# Patient Record
Sex: FEMALE | Race: WHITE | NOT HISPANIC OR LATINO
[De-identification: names, ages, dates, MRNs, and addresses within clinical notes are randomized per-mention and may not be internally consistent; named-entity substitution may affect disease eponyms.]

---

## 2017-11-06 ENCOUNTER — APPOINTMENT (OUTPATIENT)
Dept: VASCULAR SURGERY | Facility: CLINIC | Age: 67
End: 2017-11-06
Payer: COMMERCIAL

## 2017-11-06 PROCEDURE — 99213 OFFICE O/P EST LOW 20 MIN: CPT

## 2020-03-17 ENCOUNTER — APPOINTMENT (OUTPATIENT)
Dept: OTOLARYNGOLOGY | Facility: CLINIC | Age: 70
End: 2020-03-17

## 2021-03-09 ENCOUNTER — APPOINTMENT (OUTPATIENT)
Dept: OTOLARYNGOLOGY | Facility: CLINIC | Age: 71
End: 2021-03-09

## 2021-03-25 ENCOUNTER — APPOINTMENT (OUTPATIENT)
Dept: ORTHOPEDIC SURGERY | Facility: CLINIC | Age: 71
End: 2021-03-25
Payer: MEDICARE

## 2021-03-25 VITALS — WEIGHT: 105 LBS | BODY MASS INDEX: 19.83 KG/M2 | RESPIRATION RATE: 16 BRPM | HEIGHT: 61 IN

## 2021-03-25 PROCEDURE — 73110 X-RAY EXAM OF WRIST: CPT | Mod: 50

## 2021-03-25 PROCEDURE — 99204 OFFICE O/P NEW MOD 45 MIN: CPT

## 2021-03-25 RX ORDER — BUPROPION HYDROCHLORIDE 100 MG/1
TABLET, FILM COATED ORAL
Refills: 0 | Status: ACTIVE | COMMUNITY

## 2021-06-17 ENCOUNTER — APPOINTMENT (OUTPATIENT)
Dept: ORTHOPEDIC SURGERY | Facility: CLINIC | Age: 71
End: 2021-06-17
Payer: MEDICARE

## 2021-06-17 VITALS — RESPIRATION RATE: 16 BRPM | BODY MASS INDEX: 19.83 KG/M2 | WEIGHT: 105 LBS | HEIGHT: 61 IN

## 2021-06-17 PROCEDURE — 99213 OFFICE O/P EST LOW 20 MIN: CPT

## 2021-08-27 ENCOUNTER — NON-APPOINTMENT (OUTPATIENT)
Age: 71
End: 2021-08-27

## 2021-08-30 ENCOUNTER — NON-APPOINTMENT (OUTPATIENT)
Age: 71
End: 2021-08-30

## 2021-08-30 ENCOUNTER — APPOINTMENT (OUTPATIENT)
Dept: VASCULAR SURGERY | Facility: CLINIC | Age: 71
End: 2021-08-30
Payer: MEDICARE

## 2021-08-30 VITALS
HEART RATE: 103 BPM | SYSTOLIC BLOOD PRESSURE: 169 MMHG | DIASTOLIC BLOOD PRESSURE: 97 MMHG | WEIGHT: 105 LBS | HEIGHT: 60 IN | BODY MASS INDEX: 20.62 KG/M2

## 2021-08-30 PROCEDURE — 93970 EXTREMITY STUDY: CPT

## 2021-08-30 PROCEDURE — 99202 OFFICE O/P NEW SF 15 MIN: CPT

## 2021-09-01 NOTE — ASSESSMENT
[Arterial/Venous Disease] : arterial/venous disease [Medication Management] : medication management [FreeTextEntry1] : 71 y/o F with plaque psoriasis presents for evaluation of varicose veins. On exam, Plaque psoriasis throughout b/l legs with scattered ecchymosis, skin appears frail. VV appreciated over the calves. Toes are warm to touch with adequate capillary refill. Venous duplex of the legs is negative for DVT/SVT. No venous reflux appreciated. I discussed findings and treatment options with patient. Given delicacy of her skin she poses a higher risk than benefit from any vascular surgery interventions. Given her vv may be treated medically. I have recommended pt continues to keep legs well moisturized and c/w compression using compression stockings. She may continue to see me as needed.

## 2021-09-01 NOTE — PROCEDURE
[FreeTextEntry1] : BLE venous US ordered today to r/o DVT: NEgative DVT/SVT. No venous reflux appreciated.

## 2021-09-01 NOTE — HISTORY OF PRESENT ILLNESS
[FreeTextEntry1] : 71 y/o F w/h/o plaque psoriasis presents for initial evaluation of varicose veins. She is a known patient of mine last seen here in 2017 for similar symptoms. Patient has been having a long standing history of varicosities but over the years her varicose veins have become larger, more pronounced and tender to touch. She states that her varicose veins burn, itch and her legs feel heavy. She denies any hx of DVT/SVT.  Denies any leg swelling, ulceration or skin color changes.

## 2021-09-01 NOTE — PHYSICAL EXAM
[Respiratory Effort] : normal respiratory effort [Normal Rate and Rhythm] : normal rate and rhythm [Varicose Veins Of Lower Extremities] : bilaterally [Ankle Swelling On The Right] : mild [Alert] : alert [Oriented to Person] : oriented to person [Oriented to Place] : oriented to place [Oriented to Time] : oriented to time [Calm] : calm [Ankle Swelling (On Exam)] : not present [] : not present [Abdomen Tenderness] : ~T ~M No abdominal tenderness [de-identified] : Friendly, cooperative, NAD  [de-identified] : NC/AT  [de-identified] : Supple  [de-identified] : FROM 5/5 x 4.  [de-identified] : Plaque psoriasis throughout b/l legs with scattered ecchymosis, skin appears frail, small vv over the calves. Toes are warm to touch with adequate capillary refill.

## 2021-09-01 NOTE — ADDENDUM
[FreeTextEntry1] : This note was written by Karin Nieves on 08/30/2021 acting as scribe for Twin Dang M.D.\par \par

## 2021-11-10 ENCOUNTER — APPOINTMENT (OUTPATIENT)
Dept: ORTHOPEDIC SURGERY | Facility: CLINIC | Age: 71
End: 2021-11-10
Payer: MEDICARE

## 2021-11-10 VITALS — HEIGHT: 60 IN | BODY MASS INDEX: 20.62 KG/M2 | WEIGHT: 105 LBS

## 2021-11-10 PROCEDURE — 99215 OFFICE O/P EST HI 40 MIN: CPT | Mod: 25

## 2021-11-10 PROCEDURE — 20605 DRAIN/INJ JOINT/BURSA W/O US: CPT | Mod: 50

## 2022-05-11 ENCOUNTER — APPOINTMENT (OUTPATIENT)
Dept: ORTHOPEDIC SURGERY | Facility: CLINIC | Age: 72
End: 2022-05-11
Payer: MEDICARE

## 2022-05-11 VITALS — WEIGHT: 105 LBS | HEIGHT: 60 IN | BODY MASS INDEX: 20.62 KG/M2 | RESPIRATION RATE: 16 BRPM

## 2022-05-11 DIAGNOSIS — M18.0 BILATERAL PRIMARY OSTEOARTHRITIS OF FIRST CARPOMETACARPAL JOINTS: ICD-10-CM

## 2022-05-11 PROCEDURE — 99214 OFFICE O/P EST MOD 30 MIN: CPT | Mod: 25

## 2022-05-11 PROCEDURE — 20605 DRAIN/INJ JOINT/BURSA W/O US: CPT | Mod: RT

## 2022-08-25 ENCOUNTER — NON-APPOINTMENT (OUTPATIENT)
Age: 72
End: 2022-08-25

## 2022-08-25 ENCOUNTER — APPOINTMENT (OUTPATIENT)
Dept: OTOLARYNGOLOGY | Facility: CLINIC | Age: 72
End: 2022-08-25

## 2022-08-25 VITALS
BODY MASS INDEX: 20.62 KG/M2 | WEIGHT: 105 LBS | HEART RATE: 82 BPM | HEIGHT: 60 IN | TEMPERATURE: 98.1 F | DIASTOLIC BLOOD PRESSURE: 51 MMHG | OXYGEN SATURATION: 99 % | SYSTOLIC BLOOD PRESSURE: 158 MMHG

## 2022-08-25 PROCEDURE — 92504 EAR MICROSCOPY EXAMINATION: CPT

## 2022-08-25 PROCEDURE — 92550 TYMPANOMETRY & REFLEX THRESH: CPT

## 2022-08-25 PROCEDURE — 92557 COMPREHENSIVE HEARING TEST: CPT

## 2022-08-25 PROCEDURE — 99204 OFFICE O/P NEW MOD 45 MIN: CPT | Mod: 25

## 2022-08-25 NOTE — PHYSICAL EXAM
[Binocular Microscopic Exam] : Binocular microscopic exam was performed [Normal] : assessment of respiratory effort is normal

## 2022-08-25 NOTE — ASSESSMENT
[FreeTextEntry1] : Discussed general tinnitus treatment including masking, amplification when appropriate, alternative treatments, helpful smartphone applications, CBT and OTC medications. VERONIQUE submitted; in the meantime she d/c her gabapentin and try a course of amitriptyline. RTC 30 d sooner prn

## 2022-08-25 NOTE — DATA REVIEWED
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

## 2022-08-27 ENCOUNTER — TRANSCRIPTION ENCOUNTER (OUTPATIENT)
Age: 72
End: 2022-08-27

## 2022-09-12 ENCOUNTER — APPOINTMENT (OUTPATIENT)
Dept: OTOLARYNGOLOGY | Facility: CLINIC | Age: 72
End: 2022-09-12

## 2022-09-12 PROCEDURE — 99214 OFFICE O/P EST MOD 30 MIN: CPT | Mod: 95

## 2022-09-12 NOTE — DATA REVIEWED
[de-identified] : 12/18: mild-severe SNHL AU\par 7/22: nl to mod-sev SNHL AU;  AU\par - Immitance testing w/ type A AU\par 8/22: mild to sev SNHL AD, nl to mod-sev SNHL AS; /90\par - Immitance testing w/ type A AU\par  [de-identified] : MR brain/IAC 7/22: SVID

## 2022-09-12 NOTE — REASON FOR VISIT
[Home] : at home, [unfilled] , at the time of the visit. [Medical Office: ([facility])___] : at the medical office located in  [Patient] : the patient

## 2022-09-12 NOTE — HISTORY OF PRESENT ILLNESS
[de-identified] : Nonlateralizing hissing-steam-like bothersome tinnitus since 7/22 but known longstanding hearing loss; the tinnitus is extremely bothersome but fluctuates quite a bit. She's tried a noise machine, gabapentin and lipoflavanoid (for a month) none of which worked; she's now tried the amitriptyline which didn't do anything. She tried a loaner HA which successfully helped the tinnitus. (+) hx abx for meningitis. Her father had hearing loss. Denies: vertigo, ear pain, drainage, frequent loud noise exp/shooting, frequent infections, hx of ear surgery or chemo. Qtip use: no\par

## 2022-09-12 NOTE — ASSESSMENT
[FreeTextEntry1] : She would like to try a higher dose of Amitriptyline prior to paying for an HA. RTC 1 month sooner prn

## 2022-09-29 ENCOUNTER — APPOINTMENT (OUTPATIENT)
Dept: OTOLARYNGOLOGY | Facility: CLINIC | Age: 72
End: 2022-09-29

## 2022-09-29 PROCEDURE — 99214 OFFICE O/P EST MOD 30 MIN: CPT | Mod: 95

## 2022-09-29 NOTE — REASON FOR VISIT
[Home] : at home, [unfilled] , at the time of the visit. [Medical Office: ([facility])___] : at the medical office located in  [Patient] : the patient [Subsequent Evaluation] : a subsequent evaluation for [FreeTextEntry2] : f/u tinnitus

## 2022-09-29 NOTE — PHYSICAL EXAM
[de-identified] : Exam limited d/t lack of in-person appearance\par Constitutional: alert & oriented\par Face/head: normocephalic & atraumatic\par Ears & nose: normal external appearance.\par Neck: no visible lymphadenopathy or masses. Normal apparent range of motion. \par Eyes: no nystagmus or scleral icterus\par Neuro: CN 2-12 grossly intact\par Skin: exposed head & neck skin unremarkable..\par Resp: no dyspnea or coughing\par Psych: normal affect and appropriate linear thinking\par

## 2022-09-29 NOTE — HISTORY OF PRESENT ILLNESS
[de-identified] : Nonlateralizing hissing-steam-like bothersome tinnitus since 7/22 but known longstanding hearing loss; the tinnitus is extremely bothersome but fluctuates quite a bit. She's tried a noise machine, gabapentin and lipoflavanoid (for a month) none of which worked. Now s/p trial of amitriptyline which gave her a very dry mouth but helped a little. (+) hx abx for meningitis. Her father had hearing loss. Denies: vertigo, ear pain, drainage, frequent loud noise exp/shooting, frequent infections, hx of ear surgery or chemo. Qtip use: no\par

## 2022-09-29 NOTE — DATA REVIEWED
[de-identified] : 12/18: mild-severe SNHL AU\par 7/22: nl to mod-sev SNHL AU;  AU\par - Immitance testing w/ type A AU\par 8/22: mild to sev SNHL AD, nl to mod-sev SNHL AS; /90\par - Immitance testing w/ type A AU\par  [de-identified] : MR brain/IAC 7/22: SVID

## 2022-10-15 ENCOUNTER — APPOINTMENT (OUTPATIENT)
Dept: OTOLARYNGOLOGY | Facility: CLINIC | Age: 72
End: 2022-10-15

## 2022-10-15 PROCEDURE — 99214 OFFICE O/P EST MOD 30 MIN: CPT | Mod: 95

## 2022-10-15 NOTE — PHYSICAL EXAM
[de-identified] : Exam limited d/t lack of in-person appearance\par Constitutional: alert & oriented\par Face/head: normocephalic & atraumatic\par Ears & nose: normal external appearance.\par Neck: no visible lymphadenopathy or masses. Normal apparent range of motion. \par Eyes: no nystagmus or scleral icterus\par Neuro: CN 2-12 grossly intact\par Skin: exposed head & neck skin unremarkable..\par Resp: no dyspnea or coughing\par Psych: normal affect and appropriate linear thinking\par

## 2022-10-15 NOTE — REASON FOR VISIT
[Subsequent Evaluation] : a subsequent evaluation for [FreeTextEntry2] : f/u tinnitus [Home] : at home, [unfilled] , at the time of the visit. [Medical Office: ([facility])___] : at the medical office located in  [Patient] : the patient [FreeTextEntry4] :

## 2022-10-15 NOTE — DATA REVIEWED
[de-identified] : 12/18: mild-severe SNHL AU\par 7/22: nl to mod-sev SNHL AU;  AU\par - Immitance testing w/ type A AU\par 8/22: mild to sev SNHL AD, nl to mod-sev SNHL AS; /90\par - Immitance testing w/ type A AU\par  [de-identified] : MR brain/IAC 7/22: SVID

## 2022-10-15 NOTE — HISTORY OF PRESENT ILLNESS
[de-identified] : Nonlateralizing hissing-steam-like bothersome tinnitus since 7/22 but known longstanding hearing loss; the tinnitus was extremely bothersome but  she is now dramatically improved on pregabalin though w/ periods of breakthrough. Previously she failed a noise machine, gabapentin and lipoflavanoid (for a month) none of which worked. No further dry mouth off amitriptyline. (+) hx abx for meningitis. Her father had hearing loss. Denies: vertigo, ear pain, drainage, frequent loud noise exp/shooting, frequent infections, hx of ear surgery or chemo. Qtip use: no\par

## 2022-10-15 NOTE — ASSESSMENT
[FreeTextEntry1] : Increase to TID; we discussed possibly trying an extended release form. RTC 3 months sooner prn

## 2022-11-01 ENCOUNTER — APPOINTMENT (OUTPATIENT)
Dept: OTOLARYNGOLOGY | Facility: CLINIC | Age: 72
End: 2022-11-01

## 2022-11-01 PROCEDURE — 99214 OFFICE O/P EST MOD 30 MIN: CPT | Mod: 95

## 2022-11-01 NOTE — REASON FOR VISIT
[Subsequent Evaluation] : a subsequent evaluation for [Home] : at home, [unfilled] , at the time of the visit. [Medical Office: ([facility])___] : at the medical office located in  [Patient] : the patient [FreeTextEntry2] : f/u tinnitus

## 2022-11-01 NOTE — HISTORY OF PRESENT ILLNESS
[de-identified] : Nonlateralizing hissing-steam-like bothersome tinnitus since 7/22 with known longstanding hearing loss; the tinnitus was extremely bothersome but  she is now dramatically improved on pregabalin though w/ periods of breakthrough which have worsened with a recent rx w/ amox/clav for a sinus infection. Previously she failed a noise machine, gabapentin and lipoflavanoid (for a month) none of which worked. No further dry mouth off amitriptyline. (+) hx abx for meningitis. Her father had hearing loss. Denies: vertigo, ear pain, drainage, frequent loud noise exp/shooting, frequent infections, hx of ear surgery or chemo. Qtip use: no\par

## 2022-11-01 NOTE — DATA REVIEWED
[de-identified] : 12/18: mild-severe SNHL AU\par 7/22: nl to mod-sev SNHL AU;  AU\par - Immitance testing w/ type A AU\par 8/22: mild to sev SNHL AD, nl to mod-sev SNHL AS; /90\par - Immitance testing w/ type A AU [de-identified] : MR brain/IAC 7/22: SVID

## 2022-11-01 NOTE — ASSESSMENT
[FreeTextEntry1] : She would like to try a once/day dosing; will attempt to get her the ER formulation.

## 2022-11-28 ENCOUNTER — APPOINTMENT (OUTPATIENT)
Dept: OTOLARYNGOLOGY | Facility: CLINIC | Age: 72
End: 2022-11-28

## 2022-11-28 VITALS
SYSTOLIC BLOOD PRESSURE: 146 MMHG | WEIGHT: 105 LBS | BODY MASS INDEX: 20.62 KG/M2 | HEIGHT: 60 IN | HEART RATE: 103 BPM | OXYGEN SATURATION: 98 % | TEMPERATURE: 98.1 F | DIASTOLIC BLOOD PRESSURE: 75 MMHG

## 2022-11-28 PROCEDURE — 99214 OFFICE O/P EST MOD 30 MIN: CPT | Mod: 25

## 2022-11-28 PROCEDURE — 92504 EAR MICROSCOPY EXAMINATION: CPT

## 2022-11-28 RX ORDER — CLOBETASOL PROPIONATE 0.5 MG/G
0.05 CREAM TOPICAL
Qty: 60 | Refills: 0 | Status: ACTIVE | COMMUNITY
Start: 2022-03-07

## 2022-11-28 RX ORDER — TRIAMCINOLONE ACETONIDE 1 MG/G
0.1 CREAM TOPICAL
Qty: 454 | Refills: 0 | Status: ACTIVE | COMMUNITY
Start: 2022-03-07

## 2022-11-28 RX ORDER — AMITRIPTYLINE HYDROCHLORIDE 25 MG/1
25 TABLET, FILM COATED ORAL
Qty: 90 | Refills: 2 | Status: DISCONTINUED | COMMUNITY
Start: 2022-08-25 | End: 2022-11-28

## 2022-11-28 RX ORDER — NEOMYCIN SULFATE, POLYMYXIN B SULFATE AND HYDROCORTISONE 3.5; 10000; 1 MG/ML; [IU]/ML; MG/ML
3.5-10000-1 SOLUTION AURICULAR (OTIC)
Qty: 10 | Refills: 0 | Status: DISCONTINUED | COMMUNITY
Start: 2022-11-09

## 2022-11-28 RX ORDER — AMOXICILLIN AND CLAVULANATE POTASSIUM 875; 125 MG/1; MG/1
875-125 TABLET, COATED ORAL
Qty: 20 | Refills: 0 | Status: DISCONTINUED | COMMUNITY
Start: 2022-11-23

## 2022-11-28 RX ORDER — OXYCODONE AND ACETAMINOPHEN 5; 325 MG/1; MG/1
5-325 TABLET ORAL
Qty: 12 | Refills: 0 | Status: DISCONTINUED | COMMUNITY
Start: 2022-08-19

## 2022-11-28 RX ORDER — DULOXETINE HYDROCHLORIDE 20 MG/1
20 CAPSULE, DELAYED RELEASE PELLETS ORAL
Qty: 30 | Refills: 0 | Status: DISCONTINUED | COMMUNITY
Start: 2022-06-13

## 2022-11-28 RX ORDER — BUPROPION HYDROCHLORIDE 300 MG/1
300 TABLET, EXTENDED RELEASE ORAL
Qty: 30 | Refills: 0 | Status: ACTIVE | COMMUNITY
Start: 2022-11-12

## 2022-11-28 RX ORDER — AZELASTINE HYDROCHLORIDE 137 UG/1
0.1 SPRAY, METERED NASAL
Qty: 30 | Refills: 0 | Status: ACTIVE | COMMUNITY
Start: 2022-06-22

## 2022-11-28 RX ORDER — FAMOTIDINE 40 MG/1
40 TABLET, FILM COATED ORAL
Qty: 60 | Refills: 0 | Status: DISCONTINUED | COMMUNITY
Start: 2022-10-27

## 2022-11-28 NOTE — DATA REVIEWED
[de-identified] : 12/18: mild-severe SNHL AU\par 7/22: nl to mod-sev SNHL AU;  AU\par - Immitance testing w/ type A AU\par 8/22: mild to sev SNHL AD, nl to mod-sev SNHL AS; /90\par - Immitance testing w/ type A AU [de-identified] : MR brain/IAC 7/22: SVID [Time Spent: ___ minutes] : I have spent [unfilled] minutes of time on the encounter.

## 2022-11-28 NOTE — HISTORY OF PRESENT ILLNESS
[de-identified] : Nonlateralizing hissing-steam-like bothersome tinnitus since 7/22 with known longstanding hearing loss; the tinnitus was extremely bothersome and she previously she failed a noise machine, gabapentin and lipoflavanoid (for a month). She now returns after pregabalin which worked as an immediate release form but when we switched to LA it stopped working. No further dry mouth off amitriptyline. (+) hx abx for meningitis. Her father had hearing loss. Denies: vertigo, ear pain, drainage, frequent loud noise exp/shooting, frequent infections, hx of ear surgery or chemo. Qtip use: no\par

## 2022-11-28 NOTE — ASSESSMENT
[FreeTextEntry1] : We discussed options & will increase her pregabalin to the higher dose; we discussed possibly adding back amitriptyline but I asked her to discuss this w/ her psychiatrist.

## 2022-11-29 RX ORDER — PREGABALIN 75 MG/1
75 CAPSULE ORAL 3 TIMES DAILY
Qty: 270 | Refills: 1 | Status: DISCONTINUED | COMMUNITY
Start: 2022-09-29 | End: 2022-11-29

## 2022-12-20 ENCOUNTER — APPOINTMENT (OUTPATIENT)
Dept: OTOLARYNGOLOGY | Facility: CLINIC | Age: 72
End: 2022-12-20

## 2022-12-20 PROCEDURE — 99214 OFFICE O/P EST MOD 30 MIN: CPT | Mod: 95

## 2022-12-20 RX ORDER — PREGABALIN 165 MG/1
165 TABLET, FILM COATED, EXTENDED RELEASE ORAL
Qty: 30 | Refills: 2 | Status: DISCONTINUED | COMMUNITY
Start: 2022-11-01 | End: 2022-12-20

## 2022-12-20 RX ORDER — PREGABALIN 82.5 MG/1
82.5 TABLET, FILM COATED, EXTENDED RELEASE ORAL
Qty: 60 | Refills: 0 | Status: DISCONTINUED | COMMUNITY
Start: 2022-11-28 | End: 2022-12-20

## 2022-12-20 NOTE — HISTORY OF PRESENT ILLNESS
[Home] : at home, [unfilled] , at the time of the visit. [Medical Office: ([facility])___] : at the medical office located in  [Spouse] : spouse [Verbal consent obtained from patient] : the patient, [unfilled] [de-identified] : Nonlateralizing hissing-steam-like bothersome tinnitus since 7/22 with known longstanding hearing loss; the tinnitus was extremely bothersome and she previously she failed a noise machine, gabapentin and lipoflavanoid (for a month). She now returns after pregabalin which worked as an immediate release form but when we switched to LA it stopped working- now only with 4 days of relief with the higher dose of LA dosing. No further dry mouth off amitriptyline. (+) hx abx for meningitis. Her father had hearing loss. Denies: vertigo, ear pain, drainage, frequent loud noise exp/shooting, frequent infections, hx of ear surgery or chemo. Qtip use: no\par

## 2022-12-20 NOTE — DATA REVIEWED
[de-identified] : 12/18: mild-severe SNHL AU\par 7/22: nl to mod-sev SNHL AU;  AU\par - Immitance testing w/ type A AU\par 8/22: mild to sev SNHL AD, nl to mod-sev SNHL AS; /90\par - Immitance testing w/ type A AU [de-identified] : MR brain/IAC 7/22: SVID

## 2023-01-03 ENCOUNTER — TRANSCRIPTION ENCOUNTER (OUTPATIENT)
Age: 73
End: 2023-01-03

## 2023-01-06 ENCOUNTER — TRANSCRIPTION ENCOUNTER (OUTPATIENT)
Age: 73
End: 2023-01-06

## 2023-01-17 ENCOUNTER — APPOINTMENT (OUTPATIENT)
Dept: OTOLARYNGOLOGY | Facility: CLINIC | Age: 73
End: 2023-01-17

## 2023-01-31 ENCOUNTER — APPOINTMENT (OUTPATIENT)
Dept: OTOLARYNGOLOGY | Facility: CLINIC | Age: 73
End: 2023-01-31
Payer: MEDICARE

## 2023-01-31 DIAGNOSIS — H93.13 TINNITUS, BILATERAL: ICD-10-CM

## 2023-01-31 DIAGNOSIS — E03.9 HYPOTHYROIDISM, UNSPECIFIED: ICD-10-CM

## 2023-01-31 DIAGNOSIS — H61.23 IMPACTED CERUMEN, BILATERAL: ICD-10-CM

## 2023-01-31 DIAGNOSIS — H90.3 SENSORINEURAL HEARING LOSS, BILATERAL: ICD-10-CM

## 2023-01-31 PROCEDURE — 92550 TYMPANOMETRY & REFLEX THRESH: CPT | Mod: 52

## 2023-01-31 PROCEDURE — G0268 REMOVAL OF IMPACTED WAX MD: CPT

## 2023-01-31 PROCEDURE — 99214 OFFICE O/P EST MOD 30 MIN: CPT | Mod: 25

## 2023-01-31 PROCEDURE — 92557 COMPREHENSIVE HEARING TEST: CPT

## 2023-01-31 RX ORDER — PREGABALIN 100 MG/1
100 CAPSULE ORAL 3 TIMES DAILY
Qty: 270 | Refills: 1 | Status: DISCONTINUED | COMMUNITY
Start: 2022-12-20 | End: 2023-01-31

## 2023-01-31 RX ORDER — BUPROPION HYDROCHLORIDE 150 MG/1
150 TABLET, EXTENDED RELEASE ORAL
Qty: 30 | Refills: 0 | Status: DISCONTINUED | COMMUNITY
Start: 2022-05-16 | End: 2023-01-31

## 2023-01-31 RX ORDER — NORTRIPTYLINE HYDROCHLORIDE 25 MG/1
25 CAPSULE ORAL
Qty: 120 | Refills: 5 | Status: ACTIVE | COMMUNITY
Start: 2023-01-31 | End: 1900-01-01

## 2023-01-31 NOTE — DATA REVIEWED
[de-identified] : 12/18: mild-severe SNHL AU\par 7/22: nl to mod-sev SNHL AU;  AU\par - Immitance testing w/ type A AU\par 8/22: mild to sev SNHL AD, nl to mod-sev SNHL AS; /90\par - Immitance testing w/ type A AU\par today: stable\par  [de-identified] : 6/22: nl TSH\par 1/23: nl CMP, CBC [de-identified] : MR brain/IAC 7/22: SVID

## 2023-01-31 NOTE — ASSESSMENT
[FreeTextEntry1] : In an extended discussion, we reviewed all available tinnitus therapies. I encouraged her to try a hearing aid as we've discussed multiple times. Additionally of the meds the amitriptyline was the most effective so we will try nortriptyline. \par \par Cleared for HAE

## 2023-01-31 NOTE — PHYSICAL EXAM
[Binocular Microscopic Exam] : Binocular microscopic exam was performed [Normal] : the left middle ear was normal [FreeTextEntry8] : obstructing cerumen removed with a loop [FreeTextEntry9] : obstructing cerumen removed with a loop

## 2023-01-31 NOTE — HISTORY OF PRESENT ILLNESS
[de-identified] : Nonlateralizing hissing-steam-like bothersome tinnitus since 7/22 with known longstanding hearing loss; the tinnitus was extremely bothersome and she previously she failed a noise machine, gabapentin, amitriptyline (dry mouth) and lipoflavanoid (for a month). She now returns having been on various doses of pregabalin as well with a variable response. (+) hx abx for meningitis. Her father had hearing loss. Denies: vertigo, ear pain, drainage, frequent loud noise exp/shooting, frequent infections, hx of ear surgery or chemo. Qtip use: no\par She's been reluctant to try CBT, TRT or hearing aids. \par

## 2023-03-02 ENCOUNTER — APPOINTMENT (OUTPATIENT)
Dept: OTOLARYNGOLOGY | Facility: CLINIC | Age: 73
End: 2023-03-02

## 2024-12-03 NOTE — ASSESSMENT
[FreeTextEntry1] : Discussed options & will go back to the IR form at a higher dose; RTC 1 month to discuss results IPSTART~^~1~^~[id number]~^~~^~IPEND  PKSTART~^~[id number]~^~PKEND

## 2025-05-27 ENCOUNTER — APPOINTMENT (OUTPATIENT)
Dept: OTOLARYNGOLOGY | Facility: CLINIC | Age: 75
End: 2025-05-27
Payer: MEDICARE

## 2025-05-27 ENCOUNTER — APPOINTMENT (OUTPATIENT)
Dept: OTOLARYNGOLOGY | Facility: CLINIC | Age: 75
End: 2025-05-27

## 2025-05-27 VITALS
DIASTOLIC BLOOD PRESSURE: 74 MMHG | HEART RATE: 82 BPM | WEIGHT: 105 LBS | BODY MASS INDEX: 20.62 KG/M2 | HEIGHT: 60 IN | TEMPERATURE: 97 F | OXYGEN SATURATION: 98 % | SYSTOLIC BLOOD PRESSURE: 127 MMHG

## 2025-05-27 DIAGNOSIS — E03.9 HYPOTHYROIDISM, UNSPECIFIED: ICD-10-CM

## 2025-05-27 DIAGNOSIS — H61.23 IMPACTED CERUMEN, BILATERAL: ICD-10-CM

## 2025-05-27 DIAGNOSIS — H93.13 TINNITUS, BILATERAL: ICD-10-CM

## 2025-05-27 DIAGNOSIS — H90.3 SENSORINEURAL HEARING LOSS, BILATERAL: ICD-10-CM

## 2025-05-27 PROCEDURE — 92550 TYMPANOMETRY & REFLEX THRESH: CPT | Mod: 52

## 2025-05-27 PROCEDURE — 92557 COMPREHENSIVE HEARING TEST: CPT

## 2025-05-27 PROCEDURE — G0268 REMOVAL OF IMPACTED WAX MD: CPT

## 2025-05-27 PROCEDURE — 99214 OFFICE O/P EST MOD 30 MIN: CPT | Mod: 25

## 2025-05-27 RX ORDER — ZOLPIDEM TARTRATE 10 MG/1
10 TABLET, FILM COATED ORAL
Refills: 0 | Status: ACTIVE | COMMUNITY

## 2025-05-27 RX ORDER — ESCITALOPRAM OXALATE 20 MG/1
20 TABLET, FILM COATED ORAL
Refills: 0 | Status: ACTIVE | COMMUNITY

## 2025-07-08 ENCOUNTER — NON-APPOINTMENT (OUTPATIENT)
Age: 75
End: 2025-07-08

## 2025-07-10 ENCOUNTER — APPOINTMENT (OUTPATIENT)
Dept: OTOLARYNGOLOGY | Facility: CLINIC | Age: 75
End: 2025-07-10
Payer: MEDICARE

## 2025-07-10 PROCEDURE — V5010 ASSESSMENT FOR HEARING AID: CPT | Mod: NC

## 2025-07-29 ENCOUNTER — APPOINTMENT (OUTPATIENT)
Dept: OTOLARYNGOLOGY | Facility: CLINIC | Age: 75
End: 2025-07-29

## 2025-07-30 ENCOUNTER — APPOINTMENT (OUTPATIENT)
Dept: PHARMACY | Facility: CLINIC | Age: 75
End: 2025-07-30
Payer: SELF-PAY

## 2025-07-30 PROCEDURE — V5010 ASSESSMENT FOR HEARING AID: CPT | Mod: NC

## 2025-08-21 ENCOUNTER — APPOINTMENT (OUTPATIENT)
Dept: OTOLARYNGOLOGY | Facility: CLINIC | Age: 75
End: 2025-08-21
Payer: SELF-PAY

## 2025-08-21 PROCEDURE — V5258G: CUSTOM

## 2025-09-04 ENCOUNTER — APPOINTMENT (OUTPATIENT)
Dept: OTOLARYNGOLOGY | Facility: CLINIC | Age: 75
End: 2025-09-04
Payer: MEDICARE

## 2025-09-04 PROCEDURE — V5299A: CUSTOM | Mod: NC

## 2025-09-05 ENCOUNTER — APPOINTMENT (OUTPATIENT)
Dept: OTOLARYNGOLOGY | Facility: CLINIC | Age: 75
End: 2025-09-05
Payer: SELF-PAY

## 2025-09-05 PROCEDURE — V5264E: CUSTOM
